# Patient Record
Sex: FEMALE | Race: WHITE | NOT HISPANIC OR LATINO | URBAN - METROPOLITAN AREA
[De-identification: names, ages, dates, MRNs, and addresses within clinical notes are randomized per-mention and may not be internally consistent; named-entity substitution may affect disease eponyms.]

---
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
## 2020-01-03 ENCOUNTER — OFFICE VISIT - HEALTHEAST (OUTPATIENT)
Dept: PEDIATRICS | Facility: CLINIC | Age: 20
End: 2020-01-03

## 2020-01-03 DIAGNOSIS — F41.9 ANXIETY-LIKE SYMPTOMS: ICD-10-CM

## 2020-01-03 DIAGNOSIS — N92.6 IRREGULAR PERIODS: ICD-10-CM

## 2020-01-03 LAB — HCG UR QL: NEGATIVE

## 2020-01-03 ASSESSMENT — MIFFLIN-ST. JEOR: SCORE: 1540.67

## 2020-01-17 ENCOUNTER — RECORDS - HEALTHEAST (OUTPATIENT)
Dept: ADMINISTRATIVE | Facility: OTHER | Age: 20
End: 2020-01-17

## 2020-02-11 ENCOUNTER — COMMUNICATION - HEALTHEAST (OUTPATIENT)
Dept: PEDIATRICS | Facility: CLINIC | Age: 20
End: 2020-02-11

## 2020-05-20 ENCOUNTER — COMMUNICATION - HEALTHEAST (OUTPATIENT)
Dept: PEDIATRICS | Facility: CLINIC | Age: 20
End: 2020-05-20

## 2020-06-05 ENCOUNTER — COMMUNICATION - HEALTHEAST (OUTPATIENT)
Dept: PEDIATRICS | Facility: CLINIC | Age: 20
End: 2020-06-05

## 2020-06-05 ENCOUNTER — OFFICE VISIT - HEALTHEAST (OUTPATIENT)
Dept: PEDIATRICS | Facility: CLINIC | Age: 20
End: 2020-06-05

## 2020-06-05 DIAGNOSIS — N92.6 IRREGULAR PERIODS: ICD-10-CM

## 2020-06-05 DIAGNOSIS — Z30.09 ENCOUNTER FOR GENERAL COUNSELING AND ADVICE ON CONTRACEPTIVE MANAGEMENT: ICD-10-CM

## 2020-06-22 ENCOUNTER — COMMUNICATION - HEALTHEAST (OUTPATIENT)
Dept: PEDIATRICS | Facility: CLINIC | Age: 20
End: 2020-06-22

## 2020-08-15 ENCOUNTER — COMMUNICATION - HEALTHEAST (OUTPATIENT)
Dept: PEDIATRICS | Facility: CLINIC | Age: 20
End: 2020-08-15

## 2020-08-15 DIAGNOSIS — N92.6 IRREGULAR PERIODS: ICD-10-CM

## 2020-08-17 RX ORDER — NORGESTIMATE AND ETHINYL ESTRADIOL
KIT
Qty: 84 TABLET | Refills: 3 | Status: SHIPPED | OUTPATIENT
Start: 2020-08-17

## 2020-10-25 ENCOUNTER — RECORDS - HEALTHEAST (OUTPATIENT)
Dept: ADMINISTRATIVE | Facility: OTHER | Age: 20
End: 2020-10-25

## 2021-02-03 ENCOUNTER — COMMUNICATION - HEALTHEAST (OUTPATIENT)
Dept: PEDIATRICS | Facility: CLINIC | Age: 21
End: 2021-02-03

## 2021-06-01 VITALS — HEIGHT: 68 IN | BODY MASS INDEX: 22.05 KG/M2 | WEIGHT: 145.5 LBS

## 2021-06-01 VITALS — HEIGHT: 69 IN | BODY MASS INDEX: 22.07 KG/M2 | WEIGHT: 149 LBS

## 2021-06-01 VITALS — HEIGHT: 68 IN | BODY MASS INDEX: 22.67 KG/M2 | WEIGHT: 149.6 LBS

## 2021-06-01 VITALS — BODY MASS INDEX: 22.82 KG/M2 | WEIGHT: 150.06 LBS

## 2021-06-04 VITALS
BODY MASS INDEX: 23.48 KG/M2 | DIASTOLIC BLOOD PRESSURE: 66 MMHG | HEIGHT: 69 IN | HEART RATE: 72 BPM | WEIGHT: 158.56 LBS | TEMPERATURE: 98.8 F | SYSTOLIC BLOOD PRESSURE: 92 MMHG | OXYGEN SATURATION: 98 % | RESPIRATION RATE: 20 BRPM

## 2021-06-04 NOTE — PROGRESS NOTES
ASSESSMENT:  1. Irregular periods  - norgestimate-ethinyl estradiol (ORTHO TRI-CYCLEN LO) 0.18/0.215/0.25 mg-25 mcg tablet; Take 1 tablet by mouth daily.  Dispense: 84 Package; Refill: 1  - Pregnancy, Urine    2. Anxiety symptoms    Lana is not sexually active but wanting to start OCP for period regulation.  She has had increased irregularity over the first semester.  She is a non smoker and no history of migraine with aura.     PLAN: Start with ortho tri cyclen lo.  I have asked for follow up in 3-6 months when she is back home for college.  We can time it with a 20 year old annual physical at that time. Today we did discuss that typically once individuals have turned 21 we transition them to seeing a adult medicine provider.  Reviewed typical side effects of headaches or nausea when starting OCP in the first few months, but it most often improves with continuation of OCP use.  Also discussed risks of DVT increase when OCP combined with smoking.     Also discussed her starting to work with a mental health therapist once she has returned to school this upcoming month. I have advised her to reach out to me at any point through [employer] if she has concern for increasing anxiety.  We could arrange for a phone visit if needed.     There are no Patient Instructions on file for this visit.    Orders Placed This Encounter   Procedures     Pregnancy, Urine     There are no discontinued medications.    Return in about 6 months (around 7/3/2020) for Annual physical.    CHIEF COMPLAINT:  Chief Complaint   Patient presents with     Contraception     discuss birth control option. pt states decided on taking oral contraception.        HISTORY OF PRESENT ILLNESS:  Lana is a 19 y.o. female presenting to the clinic today for contraception. She was last seen in clinic on 05/02/18 for otitis externa.     School: She did not like her first year of college. Now, as she finished her first semester of sophomore year, she enjoys it  much more. 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     Birth control: She is not currently sexual active and never has been. She would like birth control to regulate her periods. She is on break from college and thought this was a good time to start. Her period has been very off this last year. Her period went from every 30 days to 46 days to 26 days. Her cramping has changed. She will get her cramping before her periods now. After the cramping sometimes she will get her period the day after and sometimes the week after. The first 2 days of her period are heavy then the last 4 are light. On her heavy days, she changes her tampons every 4 hours.    She does not smoke. There is a history of maternal aunt with DVT.     Anxiety: Her anxiety has been higher than normal. She is planning to talk to someone coming this winter. Her parents have encouraged her to talk to a counselor or someone. She has aniexty about talking to someone about her anxiety.     REVIEW OF SYSTEMS:   All other systems are negative.    PFSH:  She attends [employer].     Past Medical History:   Diagnosis Date     ADHD, Predominantly Inattentive Type     504 plan in school, no medication management     Allergic Rhinitis      Pneumonia 7/14       Family History   Problem Relation Age of Onset     Hyperlipidemia Mother         Taking medication     Hypertension Mother      Hashimoto's thyroiditis Mother      Osteoarthritis Mother      Hyperlipidemia Maternal Aunt      Hypothyroidism Maternal Aunt      Deep vein thrombosis Maternal Aunt 52     Hyperlipidemia Maternal Grandmother      Heart disease Maternal Grandmother      Hypertension Maternal Grandmother      Osteoporosis Maternal Grandmother      Leukemia Paternal Grandfather      No Medical Problems Father      No Medical Problems Sister      No Medical Problems Brother      Parkinsonism Maternal Grandfather      Breast cancer Paternal Grandmother        Past Surgical History:  "  Procedure Laterality Date     MOUTH SURGERY  08/2015       TOBACCO USE:  Social History     Tobacco Use   Smoking Status Never Smoker   Smokeless Tobacco Never Used       VITALS:  Vitals:    01/03/20 0851   BP: 92/66   Patient Site: Left Arm   Patient Position: Sitting   Cuff Size: Adult Small   Pulse: 72   Resp: 20   Temp: 98.8  F (37.1  C)   TempSrc: Oral   SpO2: 98%   Weight: 158 lb 9 oz (71.9 kg)   Height: 5' 8.5\" (1.74 m)     Wt Readings from Last 3 Encounters:   01/03/20 158 lb 9 oz (71.9 kg) (86 %, Z= 1.10)*   05/02/18 149 lb 9.6 oz (67.9 kg) (84 %, Z= 0.99)*   04/13/18 149 lb (67.6 kg) (83 %, Z= 0.97)*     * Growth percentiles are based on [facility] (Girls, 2-20 Years) data.     Body mass index is 23.76 kg/m .    PHYSICAL EXAM:  General: Alert, no acute distress.   Eyes: PERRL, EOMI, Conjunctivae clear.  Neck: Supple without lymphadenopathy or tenderness. No thyromegaly or nodules.  Lungs: Clear to auscultation bilaterally. No wheezes, rhonchi, or rales. No prolongation of expiratory phase. No tachypnea, retractions, or increased work of breathing.  Cardiac: Regular rate and rhythm, no murmur audible.  Abdomen: Soft, nontender, nondistended. Bowel sounds present. No hepatosplenomegaly or mass palpable.  Skin: Clear without rashes or lesions.    Office Visit on 01/03/2020   Component Date Value Ref Range Status     Pregnancy Test, Urine 01/03/2020 Negative  Negative Final         ADDITIONAL HISTORY SUMMARIZED (2): None.  DECISION TO OBTAIN EXTRA INFORMATION (1): None.   RADIOLOGY TESTS (1): None.  LABS (1): Labs ordered.  MEDICINE TESTS (1): None.  INDEPENDENT REVIEW (2 each): None.       The visit lasted a total of 20 minutes that I spent face to face with the patient. Over 50% of the time was spent counseling and educating the patient about contraceptions.    IAgata, am scribing for and in the presence of, Dr. Gee.    IKenna , personally performed the services described in this " documentation, as scribed by Agata Jacome in my presence, and it is both accurate and complete.    MEDICATIONS:  Current Outpatient Medications   Medication Sig Dispense Refill     FEXOFENADINE HCL (ALLEGRA ORAL) Take 120 mg by mouth daily.        fluticasone (FLONASE) 50 mcg/actuation nasal spray 1-2 sprays into each nostril daily.       naproxen (NAPROSYN) 500 MG tablet Take 1 pill every 12 hours as needed for pain 30 tablet 4     norgestimate-ethinyl estradiol (ORTHO TRI-CYCLEN LO) 0.18/0.215/0.25 mg-25 mcg tablet Take 1 tablet by mouth daily. 84 Package 1     No current facility-administered medications for this visit.        Total data points:1

## 2021-06-08 NOTE — PROGRESS NOTES
"[facility] VIDEO Acute Visit Note:    The patient has been notified of following:     \"This video visit will be conducted via a call between you and your physician/provider. We have found that certain health care needs can be provided without the need for an in-person physical exam.  This service lets us provide the care you need with a video conversation.  If a prescription is necessary we can send it directly to your pharmacy.  If lab work is needed we can place an order for that and you can then stop by our lab to have the test done at a later time.    Video visits are billed at different rates depending on your insurance coverage. Please reach out to your insurance provider with any questions.    If during the course of the call the physician/provider feels a video visit is not appropriate, you will not be charged for this service.\"    Patient has given verbal consent to a Video visit? Yes    Patient would like to receive their AVS by AVS Preference: Adry.    Patient would like the video invitation sent by: Send to e-mail at: [phone]    Will anyone else be joining your video visit from another phone/email address? No        Video Start Time: 10:00 am    Video-Visit Details    Type of service:  Video Visit    Video End Time (time video stopped): 10:25 am (25 minutes)  Originating Location (pt. Location): Home    Distant Location (provider location):  [facility]     Mode of Communication:  Video Conference via [employer]      CHIEF COMPLAINT:  Chief Complaint   Patient presents with     Contraception     f/u med check for bcp       HISTORY OF PRESENT ILLNESS:  Lana Joe is a 20 y.o. female who is being evaluated via a billable video visit due to the ongoing COVID-19 pandemic.     Start: 10:00 am  End: 10:25 am    She was last seen in clinic on 1/3/2020 for irregular periods and severe cramping with periods. She has been on Ortho Tri Cyclen Lo since that time. " She states that overall she has been doing well, but in the past two months, she has been experiencing some increased spotting and cramping in the week before her period. She states that she had been taking her pill at various and inconsistent times during the day, so for the past month, she has been trying to be more consistent about taking it at 7:30 am consistently, before she leaves the house for work. She is currently on her 2nd week of active pills. She gives herself a period each month and has never taken 9 active weeks of pills without a period before, but is interested in trying this method in the future). She is not sexually active and has never been. She is a non-smoker.    She is home for the summer after finishing her sophomore year in [place]. She reports that her mood is good and she is doing well. She previously had concerns of anxiety, but denies those today and wishes to discuss contraception only.     She is overdue for a yearly physical and health maintenance screening. At her most recent clinic visit in January, her PCP did begin the discussion about possible transition to an adult provider.       REVIEW OF SYSTEMS:   All other systems are negative.    PFSH:  Social History     Social History Narrative    Lives with mom - Dennise, Dad- Tony, sister, María Elena, and brother, Radhames        Mom and Dad are      Currently home from college for the summer. Attends college at [employer].    MEDICATIONS:  Current Outpatient Medications   Medication Sig Dispense Refill     FEXOFENADINE HCL (ALLEGRA ORAL) Take 120 mg by mouth daily.        fluticasone (FLONASE) 50 mcg/actuation nasal spray 1-2 sprays into each nostril daily.       norgestimate-ethinyl estradioL (ORTHO TRI-CYCLEN LO) 0.18/0.215/0.25 mg-25 mcg tablet Take 1 tablet by mouth daily. 3 Package 0     AFLURIA QD 2019-20,3YR UP,,PF, 60 mcg (15 mcg x 4)/0.5 mL Syrg TO BE ADMINISTERED BY PHARMACIST FOR IMMUNIZATION       No current  facility-administered medications for this visit.        PHYSICAL EXAM:  GENERAL: Healthy, alert and no distress  EYES: Eyes grossly normal to inspection. No discharge or erythema, or obvious scleral/conjunctival abnormalities.  HENT: Normal cephalic/atraumatic.  External ears, nose and mouth without ulcers or lesions. No nasal drainage visible.  RESP: No audible wheeze, cough, or visible cyanosis.  No visible retractions or increased work of breathing.    SKIN: Visible skin clear. No significant rash, abnormal pigmentation or lesions.  NEURO: Cranial nerves grossly intact. Mentation and speech appropriate for age.  PSYCH: Mentation appears normal, affect normal/bright, judgement and insight intact, normal speech and appearance well-groomed      ASSESSMENT and PLAN:  1. Encounter for general counseling and advice on contraceptive management     2. Irregular periods  norgestimate-ethinyl estradioL (ORTHO TRI-CYCLEN LO) 0.18/0.215/0.25 mg-25 mcg tablet       Three month supply of current OCPs provided. Agreed with current plan of trying to be more consistent with timing of administration of pills. If continuing to have spotting, next step would be to do a trial of 9 weeks of active pills followed by a period (instructions given to patient in AVS). Discussed with patient that, while some patients do experience increased spotting with this plan, many patients experience a decrease in spotting due to the prolonged use of hormones thinning out the uterine lining. If spotting continues, next step would be higher dose estrogen formulation.   Also discussed possibility of long acting reversible contraceptive such as IUD or Nexplanon, which would need to be placed by OB or FM/OB. Patient is interested in this possibility in the future as well as a more long-term possibility.   Also discussed possible transition to adult provider or FM/OB for next physical-would recommend physical be done this summer before heading back to  [facility]. Will CC Dr. Gee to see if she has additional input. Patient acknowledged understanding and agrees with plan.    Return in about 2 months (around 8/5/2020) for yearly physical.      Nazanin Sandy MD

## 2021-06-08 NOTE — PATIENT INSTRUCTIONS - HE
I have refilled your birth control pills for a three month supply today.     At some point in the future, you may want to consider a long-acting reversible contraceptive option (LARC) such as an intra uterine device (IUD). You are also at the age where you can begin to transition to an adult physician for your next physical as well. There are multiple physicians at the [facility] that may be suitable-feel free to check them out on the website and message Dr. Gee for advice, as she knows them better than I do.     Great to talk with you today. Please contact us with any questions or concerns!    Oral Contraceptive Pills (OCP s)    Take at the same time each day with food    Two ways to start:  1. Begin on any day of the week.  It does not matter where you are in your cycle.    2. If you would like, you can pick the first Sunday of the month so that you remember the date easily      Miss 1 day: take immediately and the next day like normal    Miss 2 days: take two pills x2 days, then like normal    Miss 3 days: stop pills for 7 days, then restart    If you decide to stop, try and stop after 10th day of pill- otherwise you may have a long period or none at all    If you would like you can take up to 9 weeks of pills in a row before taking 1 week of sugar pills for your period.  This is your choice and has no negative effects on your body.       Common side effects: nausea, bloating, irregular menses, weight gain, nausea, mood changes, breast tenderness, high blood pressure, headache    Absolute contraindication to taking an OCP: Clotting, cancer, pregnancy    Spotting and pregnancy risk is highest in first 3 months    It takes 3 days to work to block pregnancy      Please stop 4 weeks before any scheduled hospitalization due to increased DVT risk      **Alert your physician IMMEDIATELY if you have the worse headache of your life, and chest pain, shortness of breath, or calf pain.         **NEVER smoke while on birth control, this increases your risk for clotting.      Birth control does not protect against STD s and a second method of protection is necessary if you become sexually active      The general failure rate when taken by teens can be as high as 8% risk of pregnancy.

## 2021-06-08 NOTE — TELEPHONE ENCOUNTER
Upcoming Appointment Question  When is the appointment: Today  What is your appointment for?: Discuss Birth control  Who is your appointment scheduled with?: Vika  What is your question/concern?: Pt is still waiting on link or telephone call X 1 hour.  Pt states she is leaving at 10 am to go out of town, possibly can have appointment later today.  Pt will be out of town X 1 week and can make an appointment after that is an option as well. Please advise.  Okay to leave a detailed message?: No

## 2021-06-10 NOTE — TELEPHONE ENCOUNTER
Refill Approved    Rx renewed per Medication Renewal Policy. Medication was last renewed on 6/5/20.    Catherine Toledo, [facility] Connection Triage/Med Refill 8/17/2020     Requested Prescriptions   Pending Prescriptions Disp Refills     TRI-LO-DIRK 0.18/0.215/0.25 mg-25 mcg tablet [Pharmacy Med Name: TRI-LO-DIRK TABLET] 84 tablet 0     Sig: TAKE 1 TABLET BY MOUTH EVERY DAY       Oral Contraceptives Protocol Passed - 8/15/2020 10:31 AM        Passed - Visit with PCP or prescribing provider visit in last 12 months      Last office visit with prescriber/PCP: Visit date not found OR same dept: 1/3/2020 Kenna Gee MD OR same specialty: 1/3/2020 Kenna Gee MD  Last physical: Visit date not found Last MTM visit: Visit date not found   Next visit within 3 mo: Visit date not found  Next physical within 3 mo: Visit date not found  Prescriber OR PCP: Nazanin Sandy MD  Last diagnosis associated with med order: 1. Irregular periods  - TRI-LO-DIRK 0.18/0.215/0.25 mg-25 mcg tablet [Pharmacy Med Name: TRI-LO-DIRK TABLET]; TAKE 1 TABLET BY MOUTH EVERY DAY  Dispense: 84 tablet; Refill: 0    If protocol passes may refill for 12 months if within 3 months of last provider visit (or a total of 15 months).

## 2021-06-15 NOTE — TELEPHONE ENCOUNTER
Called pt to schedule for PE since due and she is states due to move she is no longer coming to our clinic for care. FYI to Dr. Gee and chart updated.

## 2021-06-16 PROBLEM — T78.1XXA POLLEN-FOOD ALLERGY, INITIAL ENCOUNTER: Status: ACTIVE | Noted: 2018-04-13

## 2021-06-16 NOTE — PROGRESS NOTES
[facility] Well Child Check    ASSESSMENT & PLAN  Lana Joe is a 17  y.o. 10  m.o. who has normal growth and normal development.    Diagnoses and all orders for this visit:    Encounter for routine child health examination without abnormal findings  -     Hearing Screening  -     Vision Screening  -     PHQ9 Depression Screen    Attention deficit disorder (ADD) without hyperactivity    Acute seasonal allergic rhinitis, unspecified trigger      Lana is doing very well at the tail end of her senior year in school. She continues to excel in school without 504 plan currently, discussed possible resources available next year at [employer] [employer] [employer] if need be for support of students with ADHD.  At this time, we also discussed anxiety symptoms. She currently identifies sitting down and getting work done as a strategy to deal with anxiety at school.  Discussed follow up in clinic for worsening anxiety symptoms or difficulty completing work as needed or mood disruption.    She has current ingrown hair in groin- consistent with areas of shaving of pubic hair. Reviewed strategies to minimize ingrown hairs- frequent changing of blades or decreased shaving of area or frequency. No infection present.    Return to clinic in 1 year for a Well Child Check or sooner as needed    IMMUNIZATIONS/LABS  No immunizations due today.    REFERRALS  Dental:  Recommend routine dental care as appropriate., The patient has already established care with a dentist.  Other:  No referrals were made at this time.    ANTICIPATORY GUIDANCE  I have reviewed age appropriate anticipatory guidance.  Social:  Friends, Extracurricular Activities and Changes and Choices  Parenting:  [place]/Dependence, Homework and Confidential Health Care  Nutrition:  Dieting and Body Image  Play and Communication:  Organized Sports, Hobbies and Read Books  Health:  Activity (>45 min/day), Sleep and Dental Care  Safety:  Seat Belts and Bike/Motorcycle  Helmets  Sexuality:  Safe Sex, STD's and Contraception    HEALTH HISTORY  Do you have any concerns that you'd like to discuss today?: screening for breast cancers- relatives had breast cancer later in life. When she gets her period she get a bump or cyst in her groin area.     Breast Cancer: She has a strong paternal family history of breast cancer with onset later in life. Her paternal aunts were diagnosed around their late 40s to early 50s and her paternal grandmother was diagnosed in her 70s. She is wondering when she should start being screened.    Menstruation: She tends to develop a mass in her groin region when she has her monthly period.    Accompanied by Mother  Dennise     Do you have any significant health concerns in your family history?: No  Family History   Problem Relation Age of Onset     Hyperlipidemia Mother      Taking medication     Hyperlipidemia Maternal Aunt      Hypothyroidism Maternal Aunt      Deep vein thrombosis Maternal Aunt 52     Hyperlipidemia Maternal Grandmother      Heart disease Maternal Grandmother      Hypertension Maternal Grandmother      Leukemia Paternal Grandfather      Since your last visit, have there been any major changes in your family, such as a move, job change, separation, divorce, or death in the family?: No  Has a lack of transportation kept you from medical appointments?: No    Home  Who lives in your home?:  See narrative below.  Social History     Social History Narrative    Lives with mom - Dennise, Dad- Tony    Siblings Radhames Ring     Do you have any concerns about losing your housing?: No  Is your housing safe and comfortable?: Yes    Do you have any trouble with sleep?:  No, she falls asleep quickly in the evening. She sleeps soundly through the night without waking. She gets sufficient restful sleep each night and has a good daytime energy level.    Education  What school do you child attend?:  [employer]  What grade are you in?:  12th  How do  you perform in school (grades, behavior, attention, homework?: Well, but anxious about school. She is a senior in high school this year and will be graduating this spring. She has been stressed about completing her college applications and accepting a position for next fall. She focuses well in class and completes her work on time. She earns good grades. She has a history of ADHD but has not needed medications or a 504 plan in high school. She has learned what works for her and how to adapt to different situations. She has decided to attend the [employer] next fall for undergraduate studies. She has been experiencing anxiety lately regarding finishing up high school and transitioning to college next fall. She reports her anxiety is moderate but intense at times, however, she is mostly able to manage it. She has not had issues managing her anxiety at this time. She is most worried that she may not be as capable of managing her anxiety next year at college.  Fraternal twin sister will attend [employer].    Eating She has a good appetite. She sometimes misses meals during the day. She eats a healthy, balanced diet with a variety of fruits, vegetables, and proteins. She drinks skim milk and water daily with juice occasionally.  Do you eat regular meals including fruits and vegetables?:  no  What are you drinking (cow's milk, water, soda, juice, sports drinks, energy drinks, etc)?: cow's milk- skim, water and juice  Have you been worried that you don't have enough food?: No  Do you have concerns about your body or appearance?:  Yes- sometimes. She has lost 10 lbs in the past year intentionally. She has been working on her portion sizes and using a point system she learned from her friend to learn about proper portion sizes. She sometimes does not feel good in her body but most of the time feels comfortable in her body.    Activities  Do you have friends?:  Yes, she has good friends with whom she  gets along well and enjoys spending time.  Do you get at least one hour of physical activity per day?:  yes  How many hours a day are you in front of a screen other than for schoolwork (computer, TV, phone)?:  2  What do you do for exercise?:  Color guard, dance, gym  Do you have interest/participate in community activities/volunteers/school sports?:  yes    MENTAL HEALTH SCREENING  PHQ-2 Total Score: 1 (2/15/2018  2:46 PM)  PHQ-9 Total Score: 3 (2/15/2018  2:46 PM)    VISION/HEARING  Vision: Completed. See Results  Hearing:  Completed. See Results     Hearing Screening    125Hz 250Hz 500Hz 1000Hz 2000Hz 3000Hz 4000Hz 6000Hz 8000Hz   Right ear:   20 20 20  20 20    Left ear:   20 20 20  20 20       Visual Acuity Screening    Right eye Left eye Both eyes   Without correction: 10/10 10/10    With correction:      Comments: Plus lens passed    TB Risk Assessment:  The patient and/or parent/guardian answer positive to:  patient and/or parent/guardian answer 'no' to all screening TB questions    Dyslipidemia Risk Screening  Have either of your parents or any of your grandparents had a stroke or heart attack before age 55?: No  Any parents with high cholesterol or currently taking medications to treat?: Yes-mom     Dental  When was the last time you saw the dentist?: 0-3 months ago   Fluoride not applied today.  Last fluoride varnish application was within the past 3 months.      Patient Active Problem List   Diagnosis     Allergic Rhinitis     ADHD, Predominantly Inattentive Type     Drugs  Does the patient use tobacco/alcohol/drugs?:  no, her and her friends currently have no interest in drinking alcohol or using other substances.    Safety  Does the patient have any safety concerns (peer or home)?:  no  Does the patient use safety belts, helmets and other safety equipment?:  yes    Sex  Have you ever had sex?:  No; at this point Lana shared with me that she desires to remain abstinent until marriage.     REVIEW OF  "SYSTEMS  History obtained from mother and child.  General: Negative  Dental: She brushes her teeth daily. She does not have dental caries.  Her parents have no other health or developmental concerns.    MEASUREMENTS  Height:  5' 8\" (1.727 m)  Weight: 145 lb 8 oz (66 kg)  BMI: Body mass index is 22.12 kg/(m^2).  Blood Pressure: (!) 90/68  Blood pressure percentiles are <1 % systolic and 50 % diastolic based on NHBPEP's 4th Report. Blood pressure percentile targets: 90: 128/82, 95: 132/86, 99 + 5 mmH/98.    PHYSICAL EXAM  Constitutional: She appears well-developed and well-nourished. She is awake, alert, and cooperative.  HEENT: Head: Normocephalic. Atraumatic.   Right Ear: Normal, pearly tympanic membrane; external ear and canal normal.    Left Ear: Normal, pearly tympanic membrane; external ear and canal normal.    Nose: Nose normal.    Mouth/Throat: Mucous membranes are moist. Oropharynx is clear. Tonsils +1 bilaterally. Normal dentition.   Eyes: Conjunctivae and lids are normal. PERRL, EOMI.   Neck: Supple without lymphadenopathy or tenderness. No thyromegaly or nodules.  Cardiovascular: Normal rate and regular rhythm. No murmur heard. Femoral pulses 2+ bilaterally.  Pulmonary: Clear to auscultation bilaterally. Effort and breath sounds normal. There is normal air entry.   Chest: Normal chest wall. Breast development is normal. SMR 5.   Abdominal: Soft, nontender, nondistended. Bowel sounds are normal. No hepatosplenomegaly.  Musculoskeletal: Moving all extremities with normal range of motion. Normal strength and tone. No abnormalities. No pain in the extremities.  Spine: Spine straight without curvature noted  Genitourinary: Normal external female genitalia. SMR 5.   Neurological: She is alert and oriented x3. She has normal reflexes. Normal tone and DTRs +2 bilaterally.  Psychiatric: She has a normal mood and affect. Her speech and behavior are normal.  Skin: Clear. No rashes or lesions " noted.    ADDITIONAL HISTORY SUMMARIZED (2): None.  DECISION TO OBTAIN EXTRA INFORMATION (1): None.   RADIOLOGY TESTS (1): None.  LABS (1): None.  MEDICINE TESTS (1): None.  INDEPENDENT REVIEW (2 each): None.     The visit lasted a total of 36 minutes face to face with the patient. Over 50% of the time was spent counseling and educating the patient about her overall health and development.    ITyler, am scribing for and in the presence of, Dr. Gee.    IKenna MD, personally performed the services described in this documentation, as scribed by Tyler Ny in my presence, and it is both accurate and complete.    Total Data Points: 0

## 2021-06-16 NOTE — PROGRESS NOTES
Chief Complaint   Patient presents with     Ear Pain     RIGHT EAR PAIN / THROBBING, SWELLING, WARMTH- WORSENING  X 4 DAYS        HPI    Patient is here for 4 days of moderate right external ear pain worsened with manipulation, associated with reduced hearing. No fever, cough, nasal symptoms, sore throat.     ROS: Pertinent ROS noted in HPI.     No Known Allergies    Patient Active Problem List   Diagnosis     Allergic Rhinitis     ADHD, Predominantly Inattentive Type       Family History   Problem Relation Age of Onset     Hyperlipidemia Mother      Taking medication     Hypertension Mother      Hashimoto's thyroiditis Mother      Osteoarthritis Mother      Hyperlipidemia Maternal Aunt      Hypothyroidism Maternal Aunt      Deep vein thrombosis Maternal Aunt 52     Hyperlipidemia Maternal Grandmother      Heart disease Maternal Grandmother      Hypertension Maternal Grandmother      Osteoporosis Maternal Grandmother      Leukemia Paternal Grandfather      No Medical Problems Father      No Medical Problems Sister      No Medical Problems Brother      Parkinsonism Maternal Grandfather      Breast cancer Paternal Grandmother        Social History     Social History     Marital status: Single     Spouse name: N/A     Number of children: N/A     Years of education: N/A     Occupational History     Student      Social History Main Topics     Smoking status: Never Smoker     Smokeless tobacco: Never Used     Alcohol use No     Drug use: No     Sexual activity: No     Other Topics Concern     Not on file     Social History Narrative    Lives with mom - Dennise, Dad- Tony, sister, María Elena, and brother, Radhames        Mom and Dad are          Objective:    Vitals:    03/24/18 0808   BP: 102/56   Pulse: 72   Resp: 16   Temp: 98.1  F (36.7  C)   SpO2: 100%       Gen:NAD  Oropharynx: clear  Ears: Moderate erythema with edema of right superior distal auditory canal with marked pain to touch without fluctuance, discharge. R TM  clear without effusion. L TM clear without effusion. L ear canal normal with minimal cerumen.  Nose: no discharge  Neck:NAD  CV: RRR, no M, R, G  Pulm: CTAB        Acute otitis externa of right ear - cannot exclude developing cellulitis.   -     cefdinir (OMNICEF) 300 MG capsule; Take 1 capsule (300 mg total) by mouth 2 (two) times a day for 10 days.  -     ofloxacin (FLOXIN) 0.3 % otic solution; Administer 10 drops to right ear once daily for seven days.

## 2021-06-17 NOTE — PROGRESS NOTES
ASSESSMENT:  1. Acute otitis externa of right ear  Suggested starting ear drops, as below, and adding oral cefdinir after 24-48 hours if no significant improvement, since these medications were beneficial last month.  Recommended ENT consultation, since her presentation is unusual and symptoms have recurred.    - cefdinir (OMNICEF) 300 MG capsule; Take 1 capsule (300 mg total) by mouth 2 (two) times a day for 10 days.  Dispense: 20 capsule; Refill: 0  - ofloxacin (FLOXIN) 0.3 % otic solution; Administer 10 drops to right ear once daily for seven days.  Dispense: 10 mL; Refill: 0      PLAN:  Patient Instructions   Start ear drops today and give them 24-48 hours before starting the oral antibiotic. If you are really in pain you can start oral antibiotic sooner.    Visit ENT next week to check for an underlying condition or preventative measure. Consider taking a picture of you ear currently to show the ENT doctor.      No orders of the defined types were placed in this encounter.    Medications Discontinued During This Encounter   Medication Reason     cefdinir (OMNICEF) 300 MG capsule Reorder     ofloxacin (FLOXIN) 0.3 % otic solution Reorder       No Follow-up on file.    CHIEF COMPLAINT:  Chief Complaint   Patient presents with     Ear Pain     x 2 days hard to hear out of her right ear        HISTORY OF PRESENT ILLNESS:  Lana is a 18 y.o. female presenting to the clinic today with mom with concerns for right ear pain. Symptoms started 2 days ago without outer ear swelling and difficulty hearing. She was seen in [facility] on 3/24/2018 for otitis externa of right ear, presenting with ear swelling and difficulty hearing. She was treated with both oral amoxicillin and ofloxacin drops. She experienced complete resolution of symptoms with the antibiotic, she had some inner ear popping between episodes of ear swelling.    REVIEW OF SYSTEMS:   She has seasonal allergies whenever there is not snow. All other systems are  "negative.    PFSH:  She does not have history of frequent ear infections in childhood.    TOBACCO USE:  History   Smoking Status     Never Smoker   Smokeless Tobacco     Never Used       VITALS:  Vitals:    05/02/18 1538   Pulse: 61   Temp: 98.7  F (37.1  C)   TempSrc: Oral   SpO2: 99%   Weight: 149 lb 9.6 oz (67.9 kg)   Height: 5' 8.25\" (1.734 m)     Wt Readings from Last 3 Encounters:   05/02/18 149 lb 9.6 oz (67.9 kg) (84 %, Z= 0.99)*   04/13/18 149 lb (67.6 kg) (83 %, Z= 0.97)*   03/24/18 150 lb 1 oz (68.1 kg) (84 %, Z= 1.01)*     * Growth percentiles are based on [facility] 2-20 Years data.     Body mass index is 22.58 kg/(m^2).    PHYSICAL EXAM:  Alert, no acute distress.   HEENT, Conjunctivae are clear. Left EAC, pinna, and TM normal. Right pinna appeared normal, no tenderness with mobility of the pinna. There was mild tenderness with mobility of the tragus, no mastoid process tenderness. There was significant swelling of the external auditory canal, primarily in the anterior superior quadrant without significant erythema. EAC was occluded and TM was not visualized. Nose is clear. Oropharynx is moist and clear, without tonsillar hypertrophy, asymmetry, exudate or lesions.  Neck, No cervical or pre-auricular adenopathy.     ADDITIONAL HISTORY SUMMARIZED (2): Reviewed [facility] Note from 3/24/2018 regarding acute otitis externa of the right ear.  DECISION TO OBTAIN EXTRA INFORMATION (1): None.   RADIOLOGY TESTS (1): None.  LABS (1): None.  MEDICINE TESTS (1): None.  INDEPENDENT REVIEW (2 each): None.     The visit lasted a total of 14 minutes face to face with the patient. Over 50% of the time was spent counseling and educating the patient about right ear pain.    ILana, am scribing for and in the presence of, Dr. Galaviz.    Travis DOTSON, personally performed the services described in this documentation, as scribed by Lana Denton in my presence, and it is both accurate and " complete.    MEDICATIONS:  Current Outpatient Prescriptions   Medication Sig Dispense Refill     FEXOFENADINE HCL (ALLEGRA ORAL) Take 120 mg by mouth daily.        fluticasone (FLONASE) 50 mcg/actuation nasal spray 1-2 sprays into each nostril daily.       cefdinir (OMNICEF) 300 MG capsule Take 1 capsule (300 mg total) by mouth 2 (two) times a day for 10 days. 20 capsule 0     naproxen (NAPROSYN) 500 MG tablet Take 1 pill every 12 hours as needed for pain 30 tablet 4     ofloxacin (FLOXIN) 0.3 % otic solution Administer 10 drops to right ear once daily for seven days. 10 mL 0     No current facility-administered medications for this visit.        Total data points: 2

## 2021-06-17 NOTE — PROGRESS NOTES
"Chief complaint: Oral allergy syndrome    History of present illness: This is a pleasant 18-year-old girl here with her mom for evaluation of oral allergy syndrome.  She states for some years now she has noted difficulty with eating fruits and vegetables.  She will have an itchy mouth and throat.  Fruits including apples, cherries, plums.  She states to have difficulty with kiwi as well.  If she eats the food in the baked or processed form, she does not have any difficulty.  She does have environmental allergies during spring, summer and fall.  She does better in the winter and that she is exposed to a lot of dust.  Environmental allergy symptoms consist of itchy eyes, watery eyes, sneezing or runny nose.  She has a history of eczema but no history of asthma.  She has never been allergy tested.  She uses Allegra during the spring summer and fall.  This does help.  She uses Flonase as needed.    Past medical history: Otherwise unremarkable    Social history: She lives at home with central air basement, no exposure to mold, no animals, non-smoker    Family history: Brother with oral allergy syndrome and allergies, mother with allergies    Review of Systems performed as above and the remainder is negative.      Current Outpatient Prescriptions:      FEXOFENADINE HCL (ALLEGRA ORAL), Take 120 mg by mouth daily. , Disp: , Rfl:      fluticasone (FLONASE) 50 mcg/actuation nasal spray, 1-2 sprays into each nostril daily., Disp: , Rfl:      naproxen (NAPROSYN) 500 MG tablet, Take 1 pill every 12 hours as needed for pain, Disp: 30 tablet, Rfl: 4    No Known Allergies    /60  Pulse 80  Ht 5' 8.5\" (1.74 m)  Wt 149 lb (67.6 kg)  BMI 22.33 kg/m2  Gen: Pleasant female not in acute distress  HEENT: Eyes no erythema of the bulbar or palpebral conjunctiva, no edema. Ears: TMs well visualized, no effusions. Nose: No congestion, mucosa normal. Mouth: Throat clear, no lip or tongue edema.   Cardiac: Regular rate and rhythm, no " murmurs, rubs or gallops  Respiratory: Clear to auscultation bilaterally, no adventitious breath sounds  Lymph: No supraclavicular or cervical lymphadenopathy  Skin: No rashes or lesions  Psych: Alert and oriented times 3    Last Percutaneous Allergy Test Results  Trees  Robert, White  1:20 H  (W/F in mm): 0 (04/13/18 1611)  Birch Mix 1:20 H (W/F in mm): 0 (04/13/18 1611)  Kemper, Common 1:20 H (W/F in mm): 0 (04/13/18 1611)  Elm, American 1:20 H (W/F in mm): 0 (04/13/18 1611)  Maple, Hard/Sugar 1:20 H (W/F in mm): 0 (04/13/18 1611)  Dow Mix 1:20 H (W/F in mm): 0 (04/13/18 1611)  [place], Red 1:20 H (W/F in mm): 5/23 (04/13/18 1611)  [place], American 1:20 H (W/F in mm): 3/16 (04/13/18 1611)  Stillwater Tree 1:20 H (W/F in mm): 0 (04/13/18 1611)  Dust Mites  D. Pteronyssinus Mite 30,000 AU/ML H (W/F in mm): 0 (04/13/18 1611)  D. Farinae Mite 30,000 AU/ML H (W/F in mm: 0 (04/13/18 1611)  Grasses  Grass Mix #4 10,000 BAU/ML H: 5/28 (04/13/18 1611)  Jared Grass 1:20 H (W/F in mm): 0 (04/13/18 1611)  Cockroach  Cockroach Mix 1:10 H (W/F in mm): 0 (04/13/18 1611)  Molds/Fungi  Alternaria Tenuis 1:10 H (W/F in mm): 0 (04/13/18 1611)  Aspergillus Fumigatus 1:10 H (W/F in mm): 0 (04/13/18 1611)  Homodendrum Cladosporioides 1:10 H (W/F in mm): 0 (04/13/18 1611)  Penicillin Notatum 1:10 H (W/F in mm): 0 (04/13/18 1611)  Epicoccum 1:10 H (W/F in mm): 0 (04/13/18 1611)  Weeds  Ragweed, Short 1:20 H (W/F in mm): 9/23 (04/13/18 1611)  Dock, Sorrel 1:20 H (W/F in mm): 0 (04/13/18 1611)  Lamb's Quarter 1:20 H (W/F in mm): 0 (04/13/18 1611)  Plantain, English 1:20 H  (W/F in mm): 0 (04/13/18 1611)  Sagebrush, Mugwort 1:20 H  (W/F in mm): 0 (04/13/18 1611)  Animal  Cat 10,000 BAU/ML H (W/F in mm): 7/15 (04/13/18 1611)  Dog 1:10 H (W/F in mm): 0 (04/13/18 1611)  Controls  Device Type: QUINTIP (04/13/18 1611)  Neg. control: 50% Glycerine/Saline H (W/F in mm): 0 (04/13/18 1611)  Pos. control: Histamine 6mg/ML (W/F in mms): 3/10  (04/13/18 1684)      Impression report and plan:    1.  Oral Allergy Syndrome  2.  Allergic rhinitis    Avoid foods that cause her difficulty in the raw form.  I did discuss with her allergy shots but she does have a fear of needles.  Could consider sublingual immunotherapy, however, is unclear if this would improve oral allergy syndrome.  In regards to her allergic rhinitis, I recommended nasal rinses.  I also recommend Flonase and Allegra as she is doing.  Follow as needed.

## 2021-06-18 ENCOUNTER — COMMUNICATION - HEALTHEAST (OUTPATIENT)
Dept: PEDIATRICS | Facility: CLINIC | Age: 21
End: 2021-06-18

## 2021-06-18 DIAGNOSIS — N92.6 IRREGULAR PERIODS: ICD-10-CM

## 2021-06-20 NOTE — LETTER
Letter by Kenna Gee MD at      Author: Kenna Gee MD Service: -- Author Type: --    Filed:  Encounter Date: 2/11/2020 Status: (Other)         Lana Joe  [address] [facility] [address]               February 11, 2020      Dear Lana:    In addition to helping you feel better when you are sick, we are interested in preventing illness and injury in the first place. In the spirit of maintaining your good health, our record indicates that you are due for the following:    Health Maintenance Due   Topic Date Due   ? HIV SCREENING  03/28/2015   ? CHLAMYDIA SCREENING  03/28/2015   ? ADVANCE CARE PLANNING  03/28/2018   ? PREVENTIVE CARE VISIT  02/15/2019   ? INFLUENZA VACCINE RULE BASED (1) 08/01/2019     Please call us to make an appointment at your earliest convenience. I look forward to seeing you soon.    Sincerely,     Your [facility] Care Team

## 2021-06-26 ENCOUNTER — HEALTH MAINTENANCE LETTER (OUTPATIENT)
Age: 21
End: 2021-06-26

## 2021-06-26 NOTE — TELEPHONE ENCOUNTER
Please call patient and pharmacy.  Patient was contacted in February 2021 regarding overdue care and health maintenance and informed clinic that she was no longer coming to this clinic.  Please find out who she is going to now, so the pharmacy can send this refill request to that provider and not to our clinic. Please inform patient that she needs to get refills from the provider that she is seeing now. Thanks.

## 2021-06-26 NOTE — TELEPHONE ENCOUNTER
Former patient of ??? & has not established care with another provider.  Please assign refill request to covering provider per Clinic standard process.      RN cannot approve Refill Request    RN can NOT refill this medication Protocol failed and NO refill given and no pcp. Last office visit: Visit date not found Last Physical: Visit date not found Last MTM visit: Visit date not found Last visit same specialty: 1/3/2020 Kenna Gee MD.  Next visit within 3 mo: Visit date not found  Next physical within 3 mo: Visit date not found      Sandor Monson, [facility] Triage/Med Refill 6/18/2021    Requested Prescriptions   Pending Prescriptions Disp Refills     TRI-LO-DIRK 0.18/0.215/0.25 mg-25 mcg tablet [Pharmacy Med Name: TRI-LO-DIRK TABLET] 84 tablet 3     Sig: TAKE 1 TABLET BY MOUTH EVERY DAY       Oral Contraceptives Protocol Failed - 6/18/2021 12:22 AM        Failed - Visit with PCP or prescribing provider visit in last 12 months      Last office visit with prescriber/PCP: Visit date not found OR same dept: Visit date not found OR same specialty: 1/3/2020 Kenna Gee MD  Last physical: Visit date not found Last MTM visit: Visit date not found   Next visit within 3 mo: Visit date not found  Next physical within 3 mo: Visit date not found  Prescriber OR PCP: Nazanin Sandy MD  Last diagnosis associated with med order: 1. Irregular periods  - TRI-LO-DIRK 0.18/0.215/0.25 mg-25 mcg tablet [Pharmacy Med Name: TRI-LO-DIRK TABLET]; TAKE 1 TABLET BY MOUTH EVERY DAY  Dispense: 84 tablet; Refill: 3    If protocol passes may refill for 12 months if within 3 months of last provider visit (or a total of 15 months).

## 2021-10-16 ENCOUNTER — HEALTH MAINTENANCE LETTER (OUTPATIENT)
Age: 21
End: 2021-10-16

## 2022-07-17 ENCOUNTER — HEALTH MAINTENANCE LETTER (OUTPATIENT)
Age: 22
End: 2022-07-17

## 2022-09-25 ENCOUNTER — HEALTH MAINTENANCE LETTER (OUTPATIENT)
Age: 22
End: 2022-09-25

## 2023-08-05 ENCOUNTER — HEALTH MAINTENANCE LETTER (OUTPATIENT)
Age: 23
End: 2023-08-05